# Patient Record
Sex: FEMALE | HISPANIC OR LATINO | Employment: FULL TIME | URBAN - METROPOLITAN AREA
[De-identification: names, ages, dates, MRNs, and addresses within clinical notes are randomized per-mention and may not be internally consistent; named-entity substitution may affect disease eponyms.]

---

## 2021-08-31 ENCOUNTER — HOSPITAL ENCOUNTER (EMERGENCY)
Facility: MEDICAL CENTER | Age: 36
End: 2021-08-31
Attending: EMERGENCY MEDICINE
Payer: COMMERCIAL

## 2021-08-31 VITALS
RESPIRATION RATE: 18 BRPM | HEIGHT: 62 IN | DIASTOLIC BLOOD PRESSURE: 62 MMHG | HEART RATE: 68 BPM | WEIGHT: 130 LBS | OXYGEN SATURATION: 98 % | TEMPERATURE: 98 F | BODY MASS INDEX: 23.92 KG/M2 | SYSTOLIC BLOOD PRESSURE: 141 MMHG

## 2021-08-31 DIAGNOSIS — K08.89 PAIN, DENTAL: ICD-10-CM

## 2021-08-31 DIAGNOSIS — K02.9 INFECTED DENTAL CARIES: ICD-10-CM

## 2021-08-31 DIAGNOSIS — K04.7 INFECTED DENTAL CARIES: ICD-10-CM

## 2021-08-31 PROCEDURE — 700102 HCHG RX REV CODE 250 W/ 637 OVERRIDE(OP): Performed by: EMERGENCY MEDICINE

## 2021-08-31 PROCEDURE — 99283 EMERGENCY DEPT VISIT LOW MDM: CPT

## 2021-08-31 PROCEDURE — A9270 NON-COVERED ITEM OR SERVICE: HCPCS | Performed by: EMERGENCY MEDICINE

## 2021-08-31 RX ORDER — PENICILLIN V POTASSIUM 500 MG/1
500 TABLET ORAL
Qty: 40 TABLET | Refills: 0 | Status: SHIPPED | OUTPATIENT
Start: 2021-08-31 | End: 2021-09-10

## 2021-08-31 RX ORDER — IBUPROFEN 600 MG/1
600 TABLET ORAL ONCE
Status: COMPLETED | OUTPATIENT
Start: 2021-08-31 | End: 2021-08-31

## 2021-08-31 RX ORDER — IBUPROFEN 600 MG/1
600 TABLET ORAL EVERY 6 HOURS PRN
Qty: 20 TABLET | Refills: 0 | Status: SHIPPED | OUTPATIENT
Start: 2021-08-31

## 2021-08-31 RX ORDER — PENICILLIN V POTASSIUM 500 MG/1
500 TABLET ORAL ONCE
Status: COMPLETED | OUTPATIENT
Start: 2021-08-31 | End: 2021-08-31

## 2021-08-31 RX ADMIN — IBUPROFEN 600 MG: 600 TABLET ORAL at 08:09

## 2021-08-31 RX ADMIN — PENICILLIN V POTASSIUM 500 MG: 500 TABLET, FILM COATED ORAL at 08:09

## 2021-08-31 NOTE — ED NOTES
Pt brought back to [facility] from triage. Pt able to transfer self to bed, call light in reach. Chart up for ERP.

## 2021-08-31 NOTE — ED TRIAGE NOTES
"Claudia Goyal   36 y.o. female   Chief Complaint   Patient presents with   • Dental Pain      The patient presents with right lower dental pain for 3 days. She reports a cracked tooth. Requests anti-biotics and dental resources.     Patient ambulatory to triage with a steady gait for above mentioned complaint.  Patient is alert and oriented, speaking in full sentences, following commands and responds appropriately to questions. Not in any apparent distress. Respirations are even and unlabored.   Patient placed in lobby. Patient educated on triage process. Patient encouraged to alert staff of any changes in status.     /96 Comment: KHARI  Pulse 70   Temp 35.9 °C (96.7 °F) (Temporal)   Resp 16   Ht 1.575 m (5' 2\")   Wt 59 kg (130 lb)   SpO2 98%   BMI 23.78 kg/m²       "

## 2021-08-31 NOTE — ED PROVIDER NOTES
"ED Provider Note    Scribed for Daniel Alston M.D. by Jackson Arriola. 8/31/2021, 7:28 AM.    Primary care provider: Pcp Pt States None  Means of arrival: Walk-in  History obtained from: Patient  History limited by: None    CHIEF COMPLAINT  Chief Complaint   Patient presents with    Dental Pain       HPI  Claudia Goyal is a 36 y.o. female who presents to the Emergency Department for acute, worsening right sided dental pain and swelling. Patient states her symptoms started 3 days ago and have since been worsening. She does not currently have a dentist. She denies any fevers, vomiting, or dyspnea.     REVIEW OF SYSTEMS  Pertinent positives include right sided dental pain.   Pertinent negatives include no fevers, vomiting, or dyspnea. .        PAST MEDICAL HISTORY   has a past medical history of Anemia, Cleft palate, Ear infection, and Tuberculosis.    SURGICAL HISTORY   has a past surgical history that includes gyn surgery; cleft palate repair; primary c section; repeat c section (3/7/2012); ear middle exploration; and repeat c section w tubal ligation (8/13/2014).    SOCIAL HISTORY  Social History     Tobacco Use    Smoking status: Current Every Day Smoker     Packs/day: 0.50     Years: 10.00     Pack years: 5.00     Types: Cigarettes    Smokeless tobacco: Former User     Quit date: 2/10/2014    Tobacco comment: 2-3 cigs per day   Substance Use Topics    Alcohol use: No    Drug use: No      Social History     Substance and Sexual Activity   Drug Use No       FAMILY HISTORY  Family History   Problem Relation Age of Onset    Hypertension Mother     Hyperlipidemia Mother        CURRENT MEDICATIONS  Home Medications    **Home medications have not yet been reviewed for this encounter**         ALLERGIES  Allergies   Allergen Reactions    Nkda [No Known Drug Allergy]        PHYSICAL EXAM  VITAL SIGNS: /96 Comment: KHARI  Pulse 70   Temp 35.9 °C (96.7 °F) (Temporal)   Resp 16   Ht 1.575 m (5' 2\")   Wt 59 kg " (130 lb)   SpO2 98%   BMI 23.78 kg/m²     Nursing note and vitals reviewed.  Constitutional: Mild distress secondary to pain  HENT: Small amount of swelling to the right mid mandible. Head is atraumatic. Oropharynx is moist.   Eyes: Conjunctivae are normal. Pupils are equal, round, and reactive to light.   Cardiovascular: Normal peripheral perfusion  Respiratory: No respiratory distress.   Musculoskeletal: Normal range of motion.   Neurological: Alert. No focal deficits noted.    Skin: No rash.   Psych: Appropriate for clinical situation       COURSE & MEDICAL DECISION MAKING  Nursing notes, VS, PMSFHx reviewed in chart.    7:28 AM - Patient seen and examined at bedside. Patient will be treated with Veetid 500 mg and Motrin 600 mg. Discussed with patient that I would place her on antibiotics and encourage her to follow up with a dentist.  they are reassuring. Return precautions were discussed with the patient, and they were cleared for discharge at this time. Patient was understanding and agreeable to discharge.    DISPOSITION:  Patient will be discharged home in stable condition.    FOLLOW UP:  [facility], Emergency Dept  [address] [address] [phone]    If symptoms worsen    Navid Song D.D.S.  [address]  [facility] [facility] [place] [address]  [phone]    Schedule an appointment as soon as possible for a visit   oncall oral surgeon      OUTPATIENT MEDICATIONS:  New Prescriptions    IBUPROFEN (MOTRIN) 600 MG TAB    Take 1 Tablet by mouth every 6 hours as needed.    PENICILLIN V POTASSIUM (VEETID) 500 MG TAB    Take 1 Tablet by mouth 4 Times a Day,Before Meals and at Bedtime for 10 days.       The patient was discharged home with an information sheet on dental pain and told to return immediately for any signs or symptoms listed.  The patient agreed to the discharge precautions and follow-up plan which is documented in EPIC.    FINAL IMPRESSION  1. Pain, dental    2. Infected  dental caries          I, Jackson Arriola (Scribe), am scribing for, and in the presence of, Daniel Alston M.D..    Electronically signed by: Jackson Arriola ([place]), 8/31/2021    Daniel TANG M.D. personally performed the services described in this documentation, as scribed by Jackson Arriola in my presence, and it is both accurate and complete.    The note accurately reflects work and decisions made by me.  Daniel Alston M.D.  8/31/2021  12:02 PM

## 2021-08-31 NOTE — ED NOTES
Discharge orders received, instructions and education given, follow-up discussed, prescriptions sent to pharmacy, pt verbalized understanding.